# Patient Record
Sex: FEMALE
[De-identification: names, ages, dates, MRNs, and addresses within clinical notes are randomized per-mention and may not be internally consistent; named-entity substitution may affect disease eponyms.]

---

## 2018-06-01 PROBLEM — Z00.00 ENCOUNTER FOR PREVENTIVE HEALTH EXAMINATION: Status: ACTIVE | Noted: 2018-06-01

## 2018-06-29 ENCOUNTER — APPOINTMENT (OUTPATIENT)
Dept: INTERNAL MEDICINE | Facility: CLINIC | Age: 33
End: 2018-06-29

## 2022-01-10 ENCOUNTER — APPOINTMENT (OUTPATIENT)
Dept: HUMAN REPRODUCTION | Facility: CLINIC | Age: 37
End: 2022-01-10

## 2024-01-09 ENCOUNTER — APPOINTMENT (OUTPATIENT)
Dept: GENERAL RADIOLOGY | Facility: CLINIC | Age: 39
End: 2024-01-09
Attending: EMERGENCY MEDICINE
Payer: COMMERCIAL

## 2024-01-09 ENCOUNTER — NURSE TRIAGE (OUTPATIENT)
Dept: FAMILY MEDICINE | Facility: CLINIC | Age: 39
End: 2024-01-09

## 2024-01-09 ENCOUNTER — HOSPITAL ENCOUNTER (EMERGENCY)
Facility: CLINIC | Age: 39
Discharge: HOME | End: 2024-01-09
Attending: EMERGENCY MEDICINE | Admitting: EMERGENCY MEDICINE
Payer: COMMERCIAL

## 2024-01-09 VITALS
OXYGEN SATURATION: 97 % | DIASTOLIC BLOOD PRESSURE: 77 MMHG | SYSTOLIC BLOOD PRESSURE: 107 MMHG | HEART RATE: 70 BPM | RESPIRATION RATE: 18 BRPM | TEMPERATURE: 98.2 F

## 2024-01-09 DIAGNOSIS — R09.A2 GLOBUS SENSATION: ICD-10-CM

## 2024-01-09 PROCEDURE — 71046 X-RAY EXAM CHEST 2 VIEWS: CPT

## 2024-01-09 PROCEDURE — 99283 EMERGENCY DEPT VISIT LOW MDM: CPT | Mod: 25

## 2024-01-09 ASSESSMENT — ACTIVITIES OF DAILY LIVING (ADL): ADLS_ACUITY_SCORE: 33

## 2024-01-09 NOTE — TELEPHONE ENCOUNTER
"Pt and  calls, lives in [place] and visiting area, does not have PCP, see triage note, pt choked on piece of meat last night, called paramedics and was cleared, they recommended ER, pt informs still has sensation, able to swallow and breathe okay, they will go to ER for eval    Reason for Disposition   [1] Recovered from choking AND [2] may have swallowed a FB (foreign body)   Symptoms of FB stuck in throat or esophagus (e.g., continued pain in throat or chest, FB sensation, blood-tinged saliva)  (Exception: Pill stuck.)    Additional Information   Negative: [1] Choking or struggling to breathe now AND [2] lasts > 60 seconds   Negative: Can't cough, speak, or make any noise now (i.e., stops breathing)   Negative: Has passed out or is limp.   Negative: Bluish (or gray) lips or face now   Negative: Sounds like a life-threatening emergency to the triager   Negative: Difficulty breathing (e.g., coughing, stridor, wheezing)   Negative: Sounds like a life-threatening emergency to the triager   Negative: Choked on or inhaled food or foreign body (but did not swallow it)   Negative: Swallowed poison   Negative: Symptoms of blocked esophagus (e.g., can't swallow normal secretions, can't swallow water, drooling)   Negative: Sharp object (e.g., needle, nail, safety pin, toothpick, bone, bottle cap, pull tab, dental bridge work)  (Exceptions: Dental crowns and tiny chips of glass generally pass without any symptoms.)   Negative: Button battery (or other battery) known or suspected   Negative: Magnet   Negative: Packet of drugs (e.g., condom filled with cocaine)   Negative: Swallowed 2 or more FBs (e.g., multiple objects)   Negative: [1] Swallowed a pill AND [2] SEVERE symptoms of pill stuck in throat or esophagus (e.g., severe pain, bleeding, or can't swallow liquids)    Answer Assessment - Initial Assessment Questions  1. SUBSTANCE: \"What did the patient choke on?\"       Meat, beef chunk   2. SIZE: If the object was " "solid, ask: \"How big was it?\"       No idea  3. ONSET: \"When did it begin?\" (In minutes)       Last night   4. RESPIRATORY DISTRESS: \"Describe the patient's breathing.\", \"Is he/she able to talk?\"      No issues, feels sensation that still has something stuck   5. PREGNANCY: \"Is there any chance you are pregnant?\" \"When was your last menstrual period?\"      N/a    Answer Assessment - Initial Assessment Questions  1. OBJECT: \"What is it?\"       Piece of meat  2. SIZE: \"How large is it?\" (e.g., inches or cm; or compare it to coins)       Not sure   3. ONSET: \"How long ago did you swallow it?\" (e.g., minutes, hours)       Last night   4. HOW DID IT HAPPEN: \"Tell me how it happened.\"       Chewing and talking and accidentally inhaled it   5. OTHER SYMPTOMS: \"Are there any other symptoms?\" (e.g., pain in neck or chest, difficulty breathing, difficulty swallowing)      No  6. PREGNANCY: \"Is there any chance you are pregnant?\" \"When was your last menstrual period?\"      No    Protocols used: Choking - Inhaled Foreign Body-A-AH, Swallowed Foreign Body-A-AH    Corrine Thompson, RN, BSN  [facility]    "

## 2024-01-09 NOTE — ED TRIAGE NOTES
Pt reports that she was eating dinner last night and noted that she swallowed a piece of meat wrong, Pt denies any complications with eating or drinking and states that she can feel it move. PT VSS and ABC's intact

## 2024-01-09 NOTE — ED PROVIDER NOTES
History     Chief Complaint:  Food Bolus        The history is provided by the patient.      Marlyn Corrigan is a 38 year old female for evaluation of a food bolus. After choking on a thin piece of meat last night, the patient reports feeling like something is caught in her trachea. After choking on the meat, she said she never felt it come back up, but did cough up some bloody mucus and has felt irritation in her throat since. Her breathing is uncomfortable but not painful or difficult. She did call EMS, who thought the meat may be caught in her esophagus. She is not having any difficulty eating or drinking.     Independent Historian:   None - Patient Only    Review of External Notes:         Medications:    Levlite  Zyrtec    Past Medical History:    Allergic rhinitis  Excessive or frequent menstruation  Hordeolum externum  Acute blood loss anemia  Postoperative wound cellulitis    Past Surgical History:      Eye surgery (unspecified)  Dilation and curettage of uterus    Physical Exam   Patient Vitals for the past 24 hrs:   BP Temp Temp src Pulse Resp SpO2   24 1720 107/77 -- -- 70 -- 97 %   24 1350 (!) 139/105 98.2  F (36.8  C) Temporal 82 18 98 %        Physical Exam  Constitutional: Well appearing.  HEENT: Atraumatic.  PERRL.  EOMI. oropharynx without erythema or exudate.  Uvula is midline.  No trismus or phonation change.  No visible injury or foreign body.  Floor the mouth is soft.  Moist mucous membranes.  Neck: Soft.  Supple.  No masses or swelling.  Cardiac: Regular rate and rhythm.  No murmur or rub.  Respiratory: Clear to auscultation bilaterally.  No respiratory distress.  No wheezing, rhonchi, or rales.  Abdomen: Soft and nontender.  No guarding.  Nondistended.  Musculoskeletal: No edema.  Normal range of motion.  Neurologic: Alert and oriented.  Normal tone and bulk. Normal gait.  Psych: Normal affect.  Normal behavior.            Emergency Department Course     Imaging:  XR Chest 2  Views   Final Result   IMPRESSION: Negative chest.      Report per radiology.      Emergency Department Course & Assessments:       Assessments:  1738 I obtained history and examined the patient as noted above.   1834 I rechecked the patient and explained findings. We discussed plan for discharge and patient is in agreement with plan.      Independent Interpretation (X-rays, CTs, rhythm strip):  Chest x-ray without acute infiltrate, pneumothorax, or foreign body    Consultations/Discussion of Management or Tests:  None        Social Determinants of Health affecting care:   None    Disposition:  The patient was discharged to home.     Impression & Plan    CMS Diagnoses: None      Medical Decision Making:  Marlyn Corrigan is a 38-year-old woman who is afebrile and hemodynamically stable.  She has what sounds like globus sensation as she is eating and drinking without difficulty.  She is in no respiratory distress and is not coughing.  No significant hoarseness of her voice.  No trismus.  I see no indication for deep space imaging of the neck.  X-ray of the chest revealed no acute aspiration signs.  She has no fever or cough and I recommended against prophylactic antibiotics.  At this time, we discussed the globus sensation and plan for home with PPI for 2 weeks and close primary care follow-up and GI if any worsening symptoms.  She is in agreement feels comfortable plan.  Discussed abortive care at home and strict return precautions were given.  Her questions were answered and she was in no distress at time of discharge.      Diagnosis:    ICD-10-CM    1. Globus sensation  R09.A2          Scribe Disclosure:  IFlor, am serving as a scribe at 6:35 PM on 1/9/2024 to document services personally performed by Isma Márquez MD based on my observations and the provider's statements to me.     Rhoda DOUGLAS, am serving as a scribe at 6:13 PM on 1/9/2024 to document services personally performed by Willi  Isma ALVAREZ MD based on my observations and the provider's statements to me.    1/9/2024   Isma Márquez MD Salay, Nicholas J, MD  01/16/24 1122